# Patient Record
Sex: FEMALE | Employment: UNEMPLOYED | URBAN - METROPOLITAN AREA
[De-identification: names, ages, dates, MRNs, and addresses within clinical notes are randomized per-mention and may not be internally consistent; named-entity substitution may affect disease eponyms.]

---

## 2021-02-08 ENCOUNTER — OFFICE VISIT (OUTPATIENT)
Dept: SURGERY | Age: 41
End: 2021-02-08
Payer: COMMERCIAL

## 2021-02-08 VITALS
BODY MASS INDEX: 47.84 KG/M2 | DIASTOLIC BLOOD PRESSURE: 82 MMHG | TEMPERATURE: 97 F | OXYGEN SATURATION: 97 % | HEIGHT: 62 IN | HEART RATE: 84 BPM | SYSTOLIC BLOOD PRESSURE: 121 MMHG | WEIGHT: 260 LBS

## 2021-02-08 DIAGNOSIS — K60.3 ANAL FISTULA: Primary | ICD-10-CM

## 2021-02-08 PROCEDURE — G8484 FLU IMMUNIZE NO ADMIN: HCPCS | Performed by: SURGERY

## 2021-02-08 PROCEDURE — G8419 CALC BMI OUT NRM PARAM NOF/U: HCPCS | Performed by: SURGERY

## 2021-02-08 PROCEDURE — 1036F TOBACCO NON-USER: CPT | Performed by: SURGERY

## 2021-02-08 PROCEDURE — 99204 OFFICE O/P NEW MOD 45 MIN: CPT | Performed by: SURGERY

## 2021-02-08 PROCEDURE — G8427 DOCREV CUR MEDS BY ELIG CLIN: HCPCS | Performed by: SURGERY

## 2021-02-08 RX ORDER — AMOXICILLIN AND CLAVULANATE POTASSIUM 875; 125 MG/1; MG/1
1 TABLET, FILM COATED ORAL 2 TIMES DAILY
Qty: 14 TABLET | Refills: 0 | Status: SHIPPED | OUTPATIENT
Start: 2021-02-08 | End: 2021-02-15

## 2021-02-08 SDOH — HEALTH STABILITY: MENTAL HEALTH: HOW MANY STANDARD DRINKS CONTAINING ALCOHOL DO YOU HAVE ON A TYPICAL DAY?: NOT ASKED

## 2021-02-08 NOTE — PROGRESS NOTES
Subjective:     Patient is a 36 y.o. woman with perianal abscess     HPI: History of recurrent perianal abscess. Seen by [facility] [employer] / Jolie Jenkins at [place] some time ago. She states boil recurred about a week ago and she lanced it. Since then slightly better but still some burning pain radiating to right side. She reports prior abscess drainage (2017) her external sphincter was damaged and she has had incontinence after that. She reports this was confirmed on an MRI in [facility]. It sounds like she had some type of EUA over there as well. We asked her to obtain records from [facility] if she can. History reviewed. No pertinent past medical history. Past Surgical History:   Procedure Laterality Date    BELPHAROPTOSIS REPAIR        Not in a hospital admission. Allergies   Allergen Reactions    Technetium-99m      Blurred vision, change to taste, slurred speech, pressure in head, chest pain      Social History     Tobacco Use    Smoking status: Never Smoker    Smokeless tobacco: Never Used   Substance Use Topics    Alcohol use: Never     Frequency: Never     Binge frequency: Never     Review of Systems    GEN: reviewed and negative except as noted in HPI. GI: reviewed and negative except as noted in HPI. + bleeding from abscess area. + drainage from abscess area. A 14 point complete review of systems was conducted and was negative except as noted in HPI       Objective:     GEN: appears well, no distress, appears stated age  PSYCH: normal mood, normal affect  NECK: no neck masses, trachea midline  ENT: moist oral mucosa; anicteric  SKIN: no rash or jaundice  CV: regular heart rate and rhythm  PULM: normal respiratory effort, no wheezing  GI: soft non tender abdomen. Normal bowel sounds  RECTAL: examined with Lal Half MA present as chaperone. Small open area left anterior anal skin consistent with drained / eroded abscess. No residual pus from this area or fluctuance at present. EXT/NEURO: normal gait, strength/sensation grossly intact in all extremities    External records from  reviewed: abscess drained in ER 2017    Assessment:     Incontinence  Recurrent anal abscess +/- fistula in ano - improved after patient self lanced the area   Obesity BMI 47     Plan:     Extensive discussion with patient regarding treatment of anal abscess and fistula    Reviewed our handout. Reviewed pictures from the internet of seton and ERAF operations. Discussed higher failure rate even under good conditions. Discussed these operations may worsen continence even though they don't directly damage sphincter muscle. Extensive questions answered. Discussed role of MRI in determining fistula anatomy and sphincter complex. Discussed various treatment approaches including fistulotomy (cutting or laying open of fistula). Discussed risk of incontinence if fistula involves lots of muscle. Discussed staged approach with seton placement first then flap or ligation procedure down the road. Discussed risks, benefits, and alternatives of fistula plug. Will plan for exam under anesthesia and possible fistulotomy versus seton. Discussed risk of bleeding, infection, high recurrence rate even under good conditions. Over 45 minutes was spent in face to face time during this office visit. Antibiotic script given. MRI ordered. Encouraged her to start antibiotics tonight and get MRI scheduled this week if possible.      Jagdish Larkin M.D.  2/8/21   3:13 PM

## 2021-02-09 ENCOUNTER — HOSPITAL ENCOUNTER (OUTPATIENT)
Dept: MRI IMAGING | Age: 41
Discharge: HOME | End: 2021-02-09
Payer: COMMERCIAL

## 2021-02-09 DIAGNOSIS — K60.3 ANAL FISTULA: ICD-10-CM

## 2021-08-20 ENCOUNTER — NURSE TRIAGE (OUTPATIENT)
Dept: OTHER | Facility: CLINIC | Age: 41
End: 2021-08-20

## 2021-08-20 NOTE — TELEPHONE ENCOUNTER
Received call from [facility] at [facility] with Red Flag Complaint. Brief description of triage: patient calling with c/o blisters, bumps, and swelling on bilateral feet. See below assessment. Triage indicates for patient to see HCP within four hours, advised patient that at this time she should go to an [facility], patient agreeable. Care advice provided, patient verbalizes understanding; denies any other questions or concerns; instructed to call back for any new or worsening symptoms. Writer provided warm transfer to [facility] for appointment scheduling. Attention Provider: Thank you for allowing me to participate in the care of your patient. The patient was connected to triage in response to information provided to the [facility]. Please do not respond through this encounter as the response is not directed to a shared pool. Reason for Disposition   [1] Looks infected AND [2] large red area (> 2 in. or 5 cm)    Answer Assessment - Initial Assessment Questions  1. APPEARANCE of BLISTER: \"What does it look like? \"      Like a blister, bumps underneath, liquid filled    2. SIZE: \"How large is the blister? \" (inches, cm or compare to coins)      Quarter of dime    3. LOCATION: \"Where are the blisters located? \"       Tops feet of toes    4. WHEN: \"When did the blister happen? \"      Tuesday morning right foot, left foot next day    5. CAUSE: \"What do you think caused the blister? \"      Unsure    6. PAIN: \"Does it hurt? \" If so, ask: \"How bad is the pain? \"  (Scale 1-10; or mild, moderate, severe)      Yes, super itchy, burning, L foot is tender to touch    7. OTHER SYMPTOMS: \"Do you have any other symptoms? \" (e.g., fever)      Swelling in both feet, skin bounces back when she pushes into it, denies fever, red streaks, purple streaks    Protocols used: BLISTER - FOOT AND HAND-ADULT-